# Patient Record
Sex: FEMALE | URBAN - METROPOLITAN AREA
[De-identification: names, ages, dates, MRNs, and addresses within clinical notes are randomized per-mention and may not be internally consistent; named-entity substitution may affect disease eponyms.]

---

## 2022-05-27 ENCOUNTER — NURSE TRIAGE (OUTPATIENT)
Dept: ADMINISTRATIVE | Facility: CLINIC | Age: 37
End: 2022-05-27

## 2022-05-28 NOTE — TELEPHONE ENCOUNTER
Calling from [place], got COVID booster yesterday & last night noticed redness & pain to injection site. States the redness has spread & has a lump the size of a tennis ball to injection site, also reports injection site is itchy, denies fever. Advised pt she can treat this at home per protocol, instructed on home care advice per protocol, verbalized understanding.     Reason for Disposition   COVID-19 vaccine, injection site reaction (e.g., pain, redness, swelling), question about    Additional Information   Negative: [1] Difficulty breathing or swallowing AND [2] starts within 2 hours after injection   Negative: Sounds like a life-threatening emergency to the triager   Negative: Fever > 104 F (40 C)   Negative: Sounds like a severe, unusual reaction to the triager   Negative: [1] Redness or red streak around the injection site AND [2] started > 48 hours after getting vaccine AND [3] fever   Negative: [1] Fever > 101 F (38.3 C) AND [2] age > 60 years AND [3] started > 48 hours after getting vaccine   Negative: [1] Fever > 100.0 F (37.8 C) AND [2] bedridden (e.g., nursing home patient, CVA, chronic illness, recovering from surgery) AND [3] started > 48 hours after getting vaccine   Negative: [1] Fever > 100.0 F (37.8 C) AND [2] diabetes mellitus or weak immune system (e.g., HIV positive, cancer chemo, splenectomy, organ transplant, chronic steroids) AND [3] started > 48 hours after getting vaccine   Negative: [1] Fever > 100.0 F (37.8 C) AND [2] present > 3 days (72 hours)   Negative: [1] Fever > 100.0 F (37.8 C) AND [2] healthcare worker   Negative: [1] Redness around the injection site AND [2] started > 48 hours after getting vaccine AND [3] no fever  (Exception: red area < 1 inch or 2.5 cm wide)   Negative: [1] Pain, tenderness, or swelling at the injection site AND [2] over 3 days (72 hours) since vaccine AND [3] getting worse   Negative: [1] Pain, tenderness, or swelling at the injection  site AND [2] lasts > 7 days   Negative: [1] Lymph node swelling (i.e., armpit or neck on side of vaccine) AND [2] lasts > 3 weeks   Negative: [1] Requesting COVID-19 vaccine AND [2] healthcare worker (e.g., EMS first responders, doctors, nurses)   Negative: [1] Requesting COVID-19 vaccine AND [2] resident of a long-term care facility (e.g., nursing home)   Negative: Requesting COVID-19 vaccine    Protocols used: CORONAVIRUS (COVID-19) VACCINE QUESTIONS AND [id number]